# Patient Record
Sex: MALE | Race: WHITE | NOT HISPANIC OR LATINO | Employment: FULL TIME
[De-identification: names, ages, dates, MRNs, and addresses within clinical notes are randomized per-mention and may not be internally consistent; named-entity substitution may affect disease eponyms.]

---

## 2018-12-10 ENCOUNTER — RECORDS - HEALTHEAST (OUTPATIENT)
Dept: ADMINISTRATIVE | Facility: OTHER | Age: 58
End: 2018-12-10

## 2019-06-13 ENCOUNTER — RECORDS - HEALTHEAST (OUTPATIENT)
Dept: ADMINISTRATIVE | Facility: OTHER | Age: 59
End: 2019-06-13

## 2019-06-13 LAB
ALT SERPL W/O P-5'-P-CCNC: 38 U/L (ref 21–72)
AST SERPL-CCNC: 31 U/L (ref 17–59)
CHOLEST SERPL-MCNC: 299 MG/DL (ref 0–200)
CREAT SERPL-MCNC: 0.9 MG/DL (ref 0.7–1.4)
GFR ESTIMATE EXT - HISTORICAL: >60 ML/MIN/1.73M2
HDLC SERPL-MCNC: 107 MG/DL (ref 35–65)
LDLC SERPL CALC-MCNC: 171 MG/DL (ref 0–130)
TRIGLYCERIDES (HISTORICAL CONVERSION): 101 MG/DL (ref 0–200)

## 2021-01-26 ENCOUNTER — COMMUNICATION - HEALTHEAST (OUTPATIENT)
Dept: FAMILY MEDICINE | Facility: CLINIC | Age: 61
End: 2021-01-26

## 2021-01-26 ENCOUNTER — OFFICE VISIT - HEALTHEAST (OUTPATIENT)
Dept: FAMILY MEDICINE | Facility: CLINIC | Age: 61
End: 2021-01-26

## 2021-01-26 DIAGNOSIS — Z76.89 ENCOUNTER TO ESTABLISH CARE: ICD-10-CM

## 2021-01-26 DIAGNOSIS — G47.33 OSA ON CPAP: ICD-10-CM

## 2021-01-26 DIAGNOSIS — Z00.00 ROUTINE GENERAL MEDICAL EXAMINATION AT A HEALTH CARE FACILITY: ICD-10-CM

## 2021-01-26 DIAGNOSIS — K57.30 DIVERTICULOSIS OF LARGE INTESTINE WITHOUT HEMORRHAGE: ICD-10-CM

## 2021-01-26 LAB
ALBUMIN SERPL-MCNC: 4.6 G/DL (ref 3.5–5)
ALP SERPL-CCNC: 66 U/L (ref 45–120)
ALT SERPL W P-5'-P-CCNC: 28 U/L (ref 0–45)
ANION GAP SERPL CALCULATED.3IONS-SCNC: 10 MMOL/L (ref 5–18)
AST SERPL W P-5'-P-CCNC: 28 U/L (ref 0–40)
BILIRUB SERPL-MCNC: 0.8 MG/DL (ref 0–1)
BUN SERPL-MCNC: 17 MG/DL (ref 8–22)
CALCIUM SERPL-MCNC: 9.5 MG/DL (ref 8.5–10.5)
CHLORIDE BLD-SCNC: 100 MMOL/L (ref 98–107)
CHOLEST SERPL-MCNC: 307 MG/DL
CO2 SERPL-SCNC: 27 MMOL/L (ref 22–31)
CREAT SERPL-MCNC: 0.94 MG/DL (ref 0.7–1.3)
FASTING STATUS PATIENT QL REPORTED: ABNORMAL
GFR SERPL CREATININE-BSD FRML MDRD: >60 ML/MIN/1.73M2
GLUCOSE BLD-MCNC: 98 MG/DL (ref 70–125)
HDLC SERPL-MCNC: 93 MG/DL
LDLC SERPL CALC-MCNC: 198 MG/DL
POTASSIUM BLD-SCNC: 4.4 MMOL/L (ref 3.5–5)
PROT SERPL-MCNC: 7.5 G/DL (ref 6–8)
PSA SERPL-MCNC: 1.4 NG/ML (ref 0–3.5)
SODIUM SERPL-SCNC: 137 MMOL/L (ref 136–145)
TRIGL SERPL-MCNC: 79 MG/DL

## 2021-01-26 RX ORDER — TADALAFIL 5 MG/1
5 TABLET ORAL
Status: SHIPPED | COMMUNITY
Start: 2021-01-26 | End: 2021-09-20

## 2021-01-26 RX ORDER — ASPIRIN 325 MG
325 TABLET ORAL
Status: SHIPPED | COMMUNITY
Start: 2021-01-26

## 2021-01-26 ASSESSMENT — ANXIETY QUESTIONNAIRES
5. BEING SO RESTLESS THAT IT IS HARD TO SIT STILL: SEVERAL DAYS
7. FEELING AFRAID AS IF SOMETHING AWFUL MIGHT HAPPEN: NOT AT ALL
6. BECOMING EASILY ANNOYED OR IRRITABLE: SEVERAL DAYS
4. TROUBLE RELAXING: SEVERAL DAYS
2. NOT BEING ABLE TO STOP OR CONTROL WORRYING: SEVERAL DAYS
IF YOU CHECKED OFF ANY PROBLEMS ON THIS QUESTIONNAIRE, HOW DIFFICULT HAVE THESE PROBLEMS MADE IT FOR YOU TO DO YOUR WORK, TAKE CARE OF THINGS AT HOME, OR GET ALONG WITH OTHER PEOPLE: NOT DIFFICULT AT ALL
GAD7 TOTAL SCORE: 6
3. WORRYING TOO MUCH ABOUT DIFFERENT THINGS: SEVERAL DAYS
1. FEELING NERVOUS, ANXIOUS, OR ON EDGE: SEVERAL DAYS

## 2021-01-26 ASSESSMENT — MIFFLIN-ST. JEOR: SCORE: 2079.09

## 2021-01-26 ASSESSMENT — PATIENT HEALTH QUESTIONNAIRE - PHQ9: SUM OF ALL RESPONSES TO PHQ QUESTIONS 1-9: 0

## 2021-01-27 ENCOUNTER — AMBULATORY - HEALTHEAST (OUTPATIENT)
Dept: FAMILY MEDICINE | Facility: CLINIC | Age: 61
End: 2021-01-27

## 2021-01-27 ENCOUNTER — RECORDS - HEALTHEAST (OUTPATIENT)
Dept: ADMINISTRATIVE | Facility: OTHER | Age: 61
End: 2021-01-27

## 2021-01-27 ENCOUNTER — COMMUNICATION - HEALTHEAST (OUTPATIENT)
Dept: FAMILY MEDICINE | Facility: CLINIC | Age: 61
End: 2021-01-27

## 2021-01-27 DIAGNOSIS — E78.5 HYPERLIPIDEMIA, UNSPECIFIED HYPERLIPIDEMIA TYPE: ICD-10-CM

## 2021-01-27 RX ORDER — ATORVASTATIN CALCIUM 40 MG/1
40 TABLET, FILM COATED ORAL AT BEDTIME
Qty: 90 TABLET | Refills: 2 | Status: SHIPPED | OUTPATIENT
Start: 2021-01-27 | End: 2022-01-05

## 2021-02-02 ENCOUNTER — RECORDS - HEALTHEAST (OUTPATIENT)
Dept: HEALTH INFORMATION MANAGEMENT | Facility: CLINIC | Age: 61
End: 2021-02-02

## 2021-05-27 ASSESSMENT — PATIENT HEALTH QUESTIONNAIRE - PHQ9: SUM OF ALL RESPONSES TO PHQ QUESTIONS 1-9: 0

## 2021-05-28 ASSESSMENT — ANXIETY QUESTIONNAIRES: GAD7 TOTAL SCORE: 6

## 2021-06-05 VITALS
DIASTOLIC BLOOD PRESSURE: 74 MMHG | TEMPERATURE: 97.9 F | WEIGHT: 258.7 LBS | HEIGHT: 75 IN | SYSTOLIC BLOOD PRESSURE: 120 MMHG | BODY MASS INDEX: 32.16 KG/M2 | HEART RATE: 70 BPM

## 2021-06-14 NOTE — TELEPHONE ENCOUNTER
----- Message from Yunier Moore MD sent at 1/26/2021  6:23 PM CST -----  Please inform the patient that the labs that he did for his recent physical exam did show that his cholesterol level is really high at 307, his LDL is also high at 198. Both of this is at the level whereby the recommendation will be to have you started on cholesterol medication.  Even though that was a calculation of the cardiovascular disease risk at 10 years and that was 6.2% which is a still borderline regarding medication management, but I think that at this point medication is warranted because of the level of the individual cholesterol.  Otherwise the rest of the lab including the PSA as well as the kidney and liver function were all normal.  Please let me know if there is any questions.

## 2021-06-14 NOTE — TELEPHONE ENCOUNTER
Left message to call back for: patient  Information to relay to patient:  Please relay message to pt.

## 2021-06-14 NOTE — TELEPHONE ENCOUNTER
I let pt know medication was sent to pharmacy and what sx's to look for per Dr. Moore. He will follow up in 3-6 months.      Vicky Acosta, ALEXYSA

## 2021-06-14 NOTE — TELEPHONE ENCOUNTER
I have sent in a prescription for Atorvastatin at 40 mg daily. Please inform him to watch out for muscle aches and apin mainly at the hips and thigh and shoulders. If those happen he should please inform us. Will need to follow up for labs in 3-6 months.

## 2021-06-14 NOTE — TELEPHONE ENCOUNTER
Relayed message, pt interested in starting medication for this. Please send to pt's pharmacy on file.     JORDAN Self

## 2021-06-21 NOTE — LETTER
Letter by Yunier Moore MD at      Author: Yunier Moore MD Service: -- Author Type: --    Filed:  Encounter Date: 1/26/2021 Status: (Other)         Barron Delgado  [address]  [facility] [address]             January 26, 2021         Dear Mr. Delgado,    Below are the results from your recent visit:    Resulted Orders   Lipid Cascade   Result Value Ref Range    Cholesterol 307 (H) <=199 mg/dL    Triglycerides 79 <=149 mg/dL    HDL Cholesterol 93 >=40 mg/dL    LDL Calculated 198 (H) <=129 mg/dL    Patient Fasting > 8hrs? Unknown    Comprehensive Metabolic Panel   Result Value Ref Range    Sodium 137 136 - 145 mmol/L    Potassium 4.4 3.5 - 5.0 mmol/L    Chloride 100 98 - 107 mmol/L    CO2 27 22 - 31 mmol/L    Anion Gap, Calculation 10 5 - 18 mmol/L    Glucose 98 70 - 125 mg/dL    BUN 17 8 - 22 mg/dL    Creatinine 0.94 0.70 - 1.30 mg/dL    GFR MDRD Af Amer >60 >60 mL/min/1.73m2    GFR MDRD Non Af Amer >60 >60 mL/min/1.73m2    Bilirubin, Total 0.8 0.0 - 1.0 mg/dL    Calcium 9.5 8.5 - 10.5 mg/dL    Protein, Total 7.5 6.0 - 8.0 g/dL    Albumin 4.6 3.5 - 5.0 g/dL    Alkaline Phosphatase 66 45 - 120 U/L    AST 28 0 - 40 U/L    ALT 28 0 - 45 U/L    Narrative    Fasting Glucose reference range is 70-99 mg/dL per  American Diabetes Association (ADA) guidelines.   PSA, Annual Screen (Prostatic-Specific Antigen)   Result Value Ref Range    PSA 1.4 0.0 - 3.5 ng/mL    Narrative    Method is Abbott Prostate-Specific Antigen (PSA)  Standard-WHO 1st International (90:10)     Please inform the patient that the labs that he did for his recent physical exam did show that his cholesterol level is really high at 307, his LDL is also high at 198. Both of this is at the level whereby the recommendation will be to have you started on cholesterol medication.  Even though that was a calculation of the cardiovascular disease risk at 10 years and that was 6.2% which is a still borderline  regarding medication management, but I think that at this point medication is warranted because of the level of the individual cholesterol.  Otherwise the rest of the lab including the PSA as well as the kidney and liver function were all normal.  Please let me know if there is any questions.      Please call with questions or contact us using [employer].    Sincerely,        Electronically signed by Yunier Moore MD

## 2021-06-27 ENCOUNTER — HEALTH MAINTENANCE LETTER (OUTPATIENT)
Age: 61
End: 2021-06-27

## 2021-06-30 ENCOUNTER — OFFICE VISIT - HEALTHEAST (OUTPATIENT)
Dept: FAMILY MEDICINE | Facility: CLINIC | Age: 61
End: 2021-06-30

## 2021-06-30 DIAGNOSIS — E78.5 HYPERLIPIDEMIA, UNSPECIFIED HYPERLIPIDEMIA TYPE: ICD-10-CM

## 2021-06-30 LAB
CHOLEST SERPL-MCNC: 223 MG/DL
FASTING STATUS PATIENT QL REPORTED: YES
HDLC SERPL-MCNC: 85 MG/DL
LDLC SERPL CALC-MCNC: 120 MG/DL
TRIGL SERPL-MCNC: 88 MG/DL

## 2021-06-30 NOTE — PROGRESS NOTES
Progress Notes by Yunier Moore MD at 1/26/2021 12:00 PM     Author: Yunier Moore MD Service: -- Author Type: Physician    Filed: 1/26/2021  6:24 PM Encounter Date: 1/26/2021 Status: Signed    : Yunier Moore MD (Physician)       MALE PREVENTATIVE EXAM    Assessment and Plan:     Patient has been advised of split billing requirements and indicates understanding: Yes    1. Routine general medical examination at a health care facility  - Lipid Cascade  - Comprehensive Metabolic Panel  - PSA, Annual Screen (Prostatic-Specific Antigen)    2. Diverticulosis of large intestine without hemorrhage    3. Encounter to establish care    4. ZABRINA on CPAP    Reviewed these labs previously on his telephone and that he does have an increase in his cholesterol.  But since he is fasting we will go ahead and get it done today.  Daily discussed physical activity and exercise.  Discussed ASCVD risk score and will calculate that based on his results at this time.  He did sign a release of information to get his other medical information including his tetanus as well as colonoscopies.  Next follow up:  No follow-ups on file.    Immunization Review  Adult Imm Review: No immunizations due today    I discussed the following with the patient:   Adult Healthy Living: Importance of regular exercise  Healthy nutrition  Weight loss options        Subjective:   Chief Complaint: Barron Delgado is an 60 y.o. male here for a preventative health visit.    Patient has been advised of split billing requirements and indicates understanding: Yes  HPI: Patient who is new to the clinic in 3 months of Coumadin today to establish care as well as he is a physical exam.  And noted that he had a physical exam a year ago.  And major finding is that of an increase in his cholesterol level.  He noted that he has been exercising and hoping to lower it.  Noted having been on any medication for cholesterol a long  "time ago and had to stop because his wife does not like it.  We will see what the level is at this time.  Did review his past history and he does have a sleep apnea, and uses CPAP machine.  He also does have a diverticulosis and has had a diverticulitis in the past.  And noted that he did have colonoscopy about 4 years was ago.  I will get his history.    Healthy Habits  Are you taking a daily aspirin? Yes  Do you typically exercising at least 40 min, 3-4 times per week?  Yes  Do you usually eat at least 4 servings of fruit and vegetables a day, include whole grains and fiber and avoid regularly eating high fat foods? no  Have you had an eye exam in the past two years? NO  Do you see a dentist twice per year? Yes  Do you have any concerns regarding sleep? YES    Safety Screen  If you own firearms, are they secured in a locked gun cabinet or with trigger locks? Yes  No data recorded    Review of Systems: A full review of system was done today and he feels well and does not have any concerns today.  Please see above.  The rest of the review of systems are negative for all systems.     Cancer Screening     Patient has no health maintenance due at this time          Patient Care Team:  Yunier Moore MD as PCP - General (Family Medicine)        History     Reviewed By Date/Time Sections Reviewed    Yunier Moore MD 1/26/2021 12:17 PM Medical, Surgical, Tobacco, Alcohol, Drug Use, Sexual Activity, Family    Rosalia [facility] CMA 1/26/2021 11:50 AM Tobacco            Objective:   Vital Signs:      Vitals:    01/26/21 1150   BP: 120/74   Patient Site: Left Arm   Patient Position: Sitting   Cuff Size: Adult Large   Pulse: 70   Temp: 97.9  F (36.6  C)   TempSrc: Oral   Weight: (!) 258 lb 11.2 oz (117.3 kg)   Height: 6' 3\" (1.905 m)       Physical Exam:  General Appearance: Alert, cooperative, no distress, appears stated age  Head: Normocephalic, without obvious abnormality, atraumatic  Eyes: " PERRL, conjunctiva/corneas clear, EOM's intact  Ears: Normal TM's and external ear canals, both ears  Nose: Nares normal, septum midline,mucosa normal, no drainage  Throat: Lips, mucosa, and tongue normal; teeth and gums normal  Neck: Supple, symmetrical, trachea midline, no adenopathy;  thyroid: not enlarged, symmetric, no tenderness/mass/nodules; no carotid bruit or JVD  Back: Symmetric, no curvature, ROM normal, no CVA tenderness  Lungs: Clear to auscultation bilaterally, respirations unlabored  Heart: Regular rate and rhythm, S1 and S2 normal, no murmur, rub, or gallop,  Abdomen: Soft, non-tender, bowel sounds active all four quadrants,  no masses, no organomegaly  Musculoskeletal: Normal range of motion. No joint swelling or deformity.   Extremities: Extremities normal, atraumatic, no cyanosis or edema  Skin: Skin color, texture, turgor normal, no rashes or lesions  Lymph nodes: Cervical, supraclavicular, and axillary nodes normal  Neurologic: He is alert. He has normal reflexes.   Psychiatric: He has a normal mood and affect.       The 10-year ASCVD risk score (Doyle ELIEZER Jr., et al., 2013) is: 6.2%    Values used to calculate the score:      Age: 60 years      Sex: Male      Is Non- : No      Diabetic: No      Tobacco smoker: No      Systolic Blood Pressure: 120 mmHg      Is BP treated: No      HDL Cholesterol: 93 mg/dL      Total Cholesterol: 307 mg/dL         Medication List          Accurate as of January 26, 2021 12:49 PM. If you have any questions, ask your nurse or doctor.            CONTINUE taking these medications    aspirin 325 MG tablet  INSTRUCTIONS: Take 325 mg by mouth.        cholecalciferol (vitamin D3) 50 mcg (2,000 unit) Tab  INSTRUCTIONS: Take 2,000 Units by mouth.        FISH OIL ORAL  INSTRUCTIONS: Take by mouth.        melatonin 5 mg Tab tablet  INSTRUCTIONS: Take 5 mg by mouth.        MEN'S 50 PLUS MULTIVITAMIN ORAL  INSTRUCTIONS: Take by mouth.        tadalafiL 5  MG tablet  Also known as: CIALIS  INSTRUCTIONS: Take 5 mg by mouth.        ZINC ACETATE-SWEETLEAF ORAL               Additional Screenings Completed Today:

## 2021-07-04 NOTE — PROGRESS NOTES
"Progress Notes by Yunier Moore MD at 6/30/2021 11:00 AM     Author: Yunier Moore MD Service: -- Author Type: Physician    Filed: 6/30/2021  3:47 PM Encounter Date: 6/30/2021 Status: Signed    : Yunier Moore MD (Physician)           Assessment & Plan     Hyperlipidemia, unspecified hyperlipidemia type  - Lipid Cascade FASTING  He is a fasted today and I will go ahead and have him get the labs.  I think that we will likely continue with the same dose of medication that he has been.  But glad that he does not have any side effects to the medication at this time.  Did discuss healthcare maintenance.  His last colonoscopy was in 2015 and he was given a 10year follow-up.  Disorders according to the care everywhere note from [facility] includes the [facility] through [facility].  I will have that abstracted.   :[id number]}     BMI:   Estimated body mass index is 32.53 kg/m  as calculated from the following:    Height as of 1/26/21: 6' 3\" (1.905 m).    Weight as of this encounter: 260 lb 4 oz (118 kg).   The following high BMI interventions were performed this visit: encouragement to exercise, weight monitoring and prescribed dietary intake    No follow-ups on file.    Yunier Moore MD  [facility]   Barron Delgado is 60 y.o. and presents today for the following health issues   HPI   He had high cholesterol noted on his fasting lab.  This was done earlier in January.  He was started on medication atorvastatin 40 mg daily which she has been taking consistently.  Noted no side effects to the medication.  Her does not have any aches and pain and no nausea or vomiting.  He is here fasted to have a recheck of the cholesterol level.  Noted no major concerns today.  He also noted having done his last colonoscopy in 2015.  This is in care everywhere and we did review that.    Past Medical History:   Diagnosis Date   ? Diverticulitis  "     Past Surgical History:   Procedure Laterality Date   ? BRAIN SURGERY      Aneurysmal Ambolization   ? TONSILLECTOMY AND ADENOIDECTOMY       Social History     Socioeconomic History   ? Marital status:      Spouse name: None   ? Number of children: None   ? Years of education: None   ? Highest education level: None   Occupational History   ? None   Social Needs   ? Financial resource strain: None   ? Food insecurity     Worry: None     Inability: None   ? Transportation needs     Medical: None     Non-medical: None   Tobacco Use   ? Smoking status: Never Smoker   ? Smokeless tobacco: Never Used   Substance and Sexual Activity   ? Alcohol use: Yes     Alcohol/week: 10.0 standard drinks     Types: 7 Glasses of wine, 3 Cans of beer per week     Comment: Occasionally   ? Drug use: None   ? Sexual activity: Yes     Partners: Female   Lifestyle   ? Physical activity     Days per week: None     Minutes per session: None   ? Stress: None   Relationships   ? Social connections     Talks on phone: None     Gets together: None     Attends [employer] service: None     Active member of club or organization: None     Attends meetings of clubs or organizations: None     Relationship status: None   ? Intimate partner violence     Fear of current or ex partner: None     Emotionally abused: None     Physically abused: None     Forced sexual activity: None   Other Topics Concern   ? None   Social History Narrative   ? None     Family History   Problem Relation Age of Onset   ? Breast cancer Mother    ? Heart disease Father         Valvular HEART FROM PACEMAKER   ? Seizures Son    ? Colon cancer Paternal Grandfather      No Known Allergies  Current Outpatient Medications   Medication Sig Dispense Refill   ? aspirin 325 MG tablet Take 325 mg by mouth.     ? atorvastatin (LIPITOR) 40 MG tablet Take 1 tablet (40 mg total) by mouth at bedtime. 90 tablet 2   ? cholecalciferol, vitamin D3, 50 mcg (2,000 unit) Tab Take 2,000 Units by  mouth.     ? docosahexaenoic acid/epa (FISH OIL ORAL) Take by mouth.     ? melatonin 5 mg Tab tablet Take 5 mg by mouth.     ? multivit-min/folic/vit K/lycop (MEN'S 50 PLUS MULTIVITAMIN ORAL) Take by mouth.     ? tadalafiL (CIALIS) 5 MG tablet Take 5 mg by mouth.     ? ZINC ACETATE-SWEETLEAF ORAL        No current facility-administered medications for this visit.          Review of Systems   Constitutional: Negative.    All other systems reviewed and are negative.          Objective    /78 (Patient Site: Left Arm, Patient Position: Sitting, Cuff Size: Adult Large)   Pulse (!) 58   Wt (!) 260 lb 4 oz (118 kg)   SpO2 96%   BMI 32.53 kg/m    Body mass index is 32.53 kg/m .  Physical Exam   Constitutional: He appears well-developed and well-nourished. No distress.   Cardiovascular: Normal rate and regular rhythm.   Pulmonary/Chest: Effort normal and breath sounds normal.   Neurological: He is alert.

## 2021-07-06 VITALS
BODY MASS INDEX: 32.53 KG/M2 | HEART RATE: 58 BPM | SYSTOLIC BLOOD PRESSURE: 128 MMHG | OXYGEN SATURATION: 96 % | DIASTOLIC BLOOD PRESSURE: 78 MMHG | WEIGHT: 260.25 LBS

## 2021-09-16 ENCOUNTER — MYC MEDICAL ADVICE (OUTPATIENT)
Dept: FAMILY MEDICINE | Facility: CLINIC | Age: 61
End: 2021-09-16

## 2021-09-16 DIAGNOSIS — N52.9 ERECTILE DYSFUNCTION, UNSPECIFIED ERECTILE DYSFUNCTION TYPE: Primary | ICD-10-CM

## 2021-09-20 RX ORDER — TADALAFIL 5 MG/1
5 TABLET ORAL DAILY PRN
Qty: 30 TABLET | Refills: 2 | Status: SHIPPED | OUTPATIENT
Start: 2021-09-20

## 2021-09-27 ENCOUNTER — MYC MEDICAL ADVICE (OUTPATIENT)
Dept: FAMILY MEDICINE | Facility: CLINIC | Age: 61
End: 2021-09-27

## 2021-10-16 ENCOUNTER — HEALTH MAINTENANCE LETTER (OUTPATIENT)
Age: 61
End: 2021-10-16

## 2022-01-03 DIAGNOSIS — E78.5 HYPERLIPIDEMIA, UNSPECIFIED HYPERLIPIDEMIA TYPE: ICD-10-CM

## 2022-01-05 RX ORDER — ATORVASTATIN CALCIUM 40 MG/1
TABLET, FILM COATED ORAL
Qty: 90 TABLET | Refills: 1 | Status: SHIPPED | OUTPATIENT
Start: 2022-01-05 | End: 2022-01-06

## 2022-01-05 NOTE — TELEPHONE ENCOUNTER
"Last Written Prescription Date:  1/27/21  Last Fill Quantity: 90,  # refills: 2   Last office visit provider:  6/30/21     Requested Prescriptions   Pending Prescriptions Disp Refills     atorvastatin (LIPITOR) 40 MG tablet [Pharmacy Med Name: ATORVASTATIN 40MG TABLETS] 90 tablet 2     Sig: TAKE 1 TABLET(40 MG) BY MOUTH AT BEDTIME       Statins Protocol Passed - 1/3/2022  8:23 AM        Passed - LDL on file in past 12 months     Recent Labs   Lab Test 06/30/21  1121                Passed - No abnormal creatine kinase in past 12 months     No lab results found.             Passed - Recent (12 mo) or future (30 days) visit within the authorizing provider's specialty     Patient has had an office visit with the authorizing provider or a provider within the authorizing providers department within the previous 12 mos or has a future within next 30 days. See \"Patient Info\" tab in inbasket, or \"Choose Columns\" in Meds & Orders section of the refill encounter.              Passed - Medication is active on med list        Passed - Patient is age 18 or older             Jacob Roy RN 01/05/22 2:12 PM  "

## 2022-01-06 DIAGNOSIS — E78.5 HYPERLIPIDEMIA, UNSPECIFIED HYPERLIPIDEMIA TYPE: ICD-10-CM

## 2022-01-06 NOTE — TELEPHONE ENCOUNTER
Pending Prescriptions:                       Disp   Refills    atorvastatin (LIPITOR) 40 MG tablet       90 tab*1            Sig: Take 1 tablet (40 mg) by mouth At Bedtime    Last seen 6/30/21 with labs by Valencia MACHUCA on 1/6/2022 at 9:07 AM

## 2022-01-07 RX ORDER — ATORVASTATIN CALCIUM 40 MG/1
40 TABLET, FILM COATED ORAL AT BEDTIME
Qty: 90 TABLET | Refills: 3 | Status: SHIPPED | OUTPATIENT
Start: 2022-01-07 | End: 2023-01-12

## 2022-04-02 ENCOUNTER — HEALTH MAINTENANCE LETTER (OUTPATIENT)
Age: 62
End: 2022-04-02

## 2022-04-26 ENCOUNTER — OFFICE VISIT (OUTPATIENT)
Dept: FAMILY MEDICINE | Facility: CLINIC | Age: 62
End: 2022-04-26
Payer: COMMERCIAL

## 2022-04-26 VITALS
SYSTOLIC BLOOD PRESSURE: 135 MMHG | DIASTOLIC BLOOD PRESSURE: 75 MMHG | BODY MASS INDEX: 33.17 KG/M2 | WEIGHT: 265.4 LBS | HEART RATE: 62 BPM

## 2022-04-26 DIAGNOSIS — N52.9 ERECTILE DYSFUNCTION, UNSPECIFIED ERECTILE DYSFUNCTION TYPE: ICD-10-CM

## 2022-04-26 DIAGNOSIS — Z00.00 ROUTINE GENERAL MEDICAL EXAMINATION AT A HEALTH CARE FACILITY: Primary | ICD-10-CM

## 2022-04-26 DIAGNOSIS — Z11.59 NEED FOR HEPATITIS C SCREENING TEST: ICD-10-CM

## 2022-04-26 DIAGNOSIS — Z86.0100 HISTORY OF COLON POLYPS: ICD-10-CM

## 2022-04-26 LAB
ALBUMIN SERPL-MCNC: 4.4 G/DL (ref 3.5–5)
ALP SERPL-CCNC: 64 U/L (ref 45–120)
ALT SERPL W P-5'-P-CCNC: 31 U/L (ref 0–45)
ANION GAP SERPL CALCULATED.3IONS-SCNC: 12 MMOL/L (ref 5–18)
AST SERPL W P-5'-P-CCNC: 25 U/L (ref 0–40)
BILIRUB SERPL-MCNC: 0.9 MG/DL (ref 0–1)
BUN SERPL-MCNC: 19 MG/DL (ref 8–22)
CALCIUM SERPL-MCNC: 9.7 MG/DL (ref 8.5–10.5)
CHLORIDE BLD-SCNC: 102 MMOL/L (ref 98–107)
CHOLEST SERPL-MCNC: 242 MG/DL
CO2 SERPL-SCNC: 25 MMOL/L (ref 22–31)
CREAT SERPL-MCNC: 0.95 MG/DL (ref 0.7–1.3)
FASTING STATUS PATIENT QL REPORTED: ABNORMAL
GFR SERPL CREATININE-BSD FRML MDRD: >90 ML/MIN/1.73M2
GLUCOSE BLD-MCNC: 103 MG/DL (ref 70–125)
HDLC SERPL-MCNC: 91 MG/DL
LDLC SERPL CALC-MCNC: 134 MG/DL
POTASSIUM BLD-SCNC: 4.9 MMOL/L (ref 3.5–5)
PROT SERPL-MCNC: 7.5 G/DL (ref 6–8)
PSA SERPL-MCNC: 1.26 UG/L (ref 0–3.5)
SODIUM SERPL-SCNC: 139 MMOL/L (ref 136–145)
TRIGL SERPL-MCNC: 87 MG/DL

## 2022-04-26 PROCEDURE — G0103 PSA SCREENING: HCPCS | Performed by: FAMILY MEDICINE

## 2022-04-26 PROCEDURE — 99396 PREV VISIT EST AGE 40-64: CPT | Performed by: FAMILY MEDICINE

## 2022-04-26 PROCEDURE — 80053 COMPREHEN METABOLIC PANEL: CPT | Performed by: FAMILY MEDICINE

## 2022-04-26 PROCEDURE — 36415 COLL VENOUS BLD VENIPUNCTURE: CPT | Performed by: FAMILY MEDICINE

## 2022-04-26 PROCEDURE — 80061 LIPID PANEL: CPT | Performed by: FAMILY MEDICINE

## 2022-04-26 PROCEDURE — 86803 HEPATITIS C AB TEST: CPT | Performed by: FAMILY MEDICINE

## 2022-04-26 PROCEDURE — 86787 VARICELLA-ZOSTER ANTIBODY: CPT | Performed by: FAMILY MEDICINE

## 2022-04-26 RX ORDER — SILDENAFIL 50 MG/1
50 TABLET, FILM COATED ORAL DAILY PRN
Qty: 30 TABLET | Refills: 1 | Status: SHIPPED | OUTPATIENT
Start: 2022-04-26

## 2022-04-26 NOTE — PROGRESS NOTES
SUBJECTIVE:   CC: Barron Delgado is an 61 year old male who presents for preventative health visit.       Patient has been advised of split billing requirements and indicates understanding: Yes  Healthy Habits:     Getting at least 3 servings of Calcium per day:  NO    Bi-annual eye exam:  NO    Dental care twice a year:  Yes    Sleep apnea or symptoms of sleep apnea:  Daytime drowsiness, Excessive snoring and Sleep apnea    Diet:  Regular (no restrictions)    Frequency of exercise:  4-5 days/week    Duration of exercise:  Greater than 60 minutes    Taking medications regularly:  Yes    Medication side effects:  None    PHQ-2 Total Score: 0    Additional concerns today:  No    Is here for physical exam.  He does have a history of sleep apnea does have problems CPAP machine but noted that he has not been using it at this time because of losing one of the consumables.  Noted that he will be using it better now.  Also has had intermittent blood in his urine.  Noted that he had colonoscopy around 2015 probably, wonders if he needed to have another colonoscopy at this time.  He has had a previous history of colon polyps which was not noted on the last colonoscopy.  He otherwise does not have any other concerns with that.  He does feel better, has intermittent insomnia and wakes up middle of the night but will let him know if he needed to have any management for that.        Today's PHQ-2 Score:   PHQ-2 ( 1999 Pfizer) 4/19/2022   Q1: Little interest or pleasure in doing things 0   Q2: Feeling down, depressed or hopeless 0   PHQ-2 Score 0   Q1: Little interest or pleasure in doing things Not at all   Q2: Feeling down, depressed or hopeless Not at all   PHQ-2 Score 0       Abuse: Current or Past(Physical, Sexual or Emotional)- No  Do you feel safe in your environment? Yes        Social History     Tobacco Use     Smoking status: Never Smoker     Smokeless tobacco: Never Used   Substance Use Topics     Alcohol use: Yes      Alcohol/week: 10.0 standard drinks     Comment: Alcoholic Drinks/day: Occasionally     If you drink alcohol do you typically have >3 drinks per day or >7 drinks per week? No    Alcohol Use 4/19/2022   Prescreen: >3 drinks/day or >7 drinks/week? Yes   AUDIT SCORE  7     AUDIT - Alcohol Use Disorders Identification Test - Reproduced from the World Health Organization Audit 2001 (Second Edition) 4/19/2022   1.  How often do you have a drink containing alcohol? 2 to 3 times a week   2.  How many drinks containing alcohol do you have on a typical day when you are drinking? 3 or 4   3.  How often do you have five or more drinks on one occasion? Weekly   4.  How often during the last year have you found that you were not able to stop drinking once you had started? Never   5.  How often during the last year have you failed to do what was normally expected of you because of drinking? Never   6.  How often during the last year have you needed a first drink in the morning to get yourself going after a heavy drinking session? Never   7.  How often during the last year have you had a feeling of guilt or remorse after drinking? Never   8.  How often during the last year have you been unable to remember what happened the night before because of your drinking? Never   9.  Have you or someone else been injured because of your drinking? No   10. Has a relative, friend, doctor or other health care worker been concerned about your drinking or suggested you cut down? No   TOTAL SCORE 7       Last PSA:   Prostate Specific Antigen Screen   Date Value Ref Range Status   01/26/2021 1.4 0.0 - 3.5 ng/mL Final       Reviewed orders with patient. Reviewed health maintenance and updated orders accordingly - Yes      Reviewed and updated as needed this visit by clinical staff    Allergies  Meds                Reviewed and updated as needed this visit by Provider                   Family History   Problem Relation Age of Onset     Breast Cancer  Mother      Heart Disease Father         Valvular HEART FROM PACEMAKER     Seizure Disorder Son      Colon Cancer Paternal Grandfather       Social History     Socioeconomic History     Marital status:      Spouse name: Not on file     Number of children: Not on file     Years of education: Not on file     Highest education level: Not on file   Occupational History     Not on file   Tobacco Use     Smoking status: Never Smoker     Smokeless tobacco: Never Used   Substance and Sexual Activity     Alcohol use: Yes     Alcohol/week: 10.0 standard drinks     Comment: Alcoholic Drinks/day: Occasionally     Drug use: Not on file     Sexual activity: Yes     Partners: Female   Other Topics Concern     Not on file   Social History Narrative     Not on file     Social Determinants of Health     Financial Resource Strain: Not on file   Food Insecurity: Not on file   Transportation Needs: Not on file   Physical Activity: Not on file   Stress: Not on file   Social Connections: Not on file   Intimate Partner Violence: Not on file   Housing Stability: Not on file      Past Surgical History:   Procedure Laterality Date     BRAIN SURGERY      Aneurysmal Ambolization     TONSILLECTOMY & ADENOIDECTOMY        Past Medical History:   Diagnosis Date     Diverticulitis           Review of Systems  CONSTITUTIONAL: NEGATIVE for fever, chills, change in weight  INTEGUMENTARY/SKIN: NEGATIVE for worrisome rashes, moles or lesions  EYES: NEGATIVE for vision changes or irritation  ENT: NEGATIVE for ear, mouth and throat problems  RESP: NEGATIVE for significant cough or SOB  CV: NEGATIVE for chest pain, palpitations or peripheral edema  GI: NEGATIVE for nausea, abdominal pain, heartburn, or change in bowel habits   male: negative for dysuria, hematuria, decreased urinary stream, erectile dysfunction, urethral discharge  MUSCULOSKELETAL: NEGATIVE for significant arthralgias or myalgia  NEURO: NEGATIVE for weakness, dizziness or  paresthesias  PSYCHIATRIC: NEGATIVE for changes in mood or affect    OBJECTIVE:   /75 (BP Location: Left arm, Patient Position: Sitting, Cuff Size: Adult Large)   Pulse 62   Wt 120.4 kg (265 lb 6.4 oz)   BMI 33.17 kg/m      Physical Exam  GENERAL: healthy, alert and no distress  EYES: Eyes grossly normal to inspection, PERRL and conjunctivae and sclerae normal  HENT: ear canals and TM's normal, nose and mouth without ulcers or lesions  NECK: no adenopathy, no asymmetry, masses, or scars and thyroid normal to palpation  RESP: lungs clear to auscultation - no rales, rhonchi or wheezes  CV: regular rate and rhythm, normal S1 S2, no S3 or S4, no murmur, click or rub, no peripheral edema and peripheral pulses strong  ABDOMEN: soft, nontender, no hepatosplenomegaly, no masses and bowel sounds normal  MS: no gross musculoskeletal defects noted, no edema  SKIN: Examination done showing multiple skin seborrhea no concerning skin lesion was seen.  NEURO: Normal strength and tone, mentation intact and speech normal  PSYCH: mentation appears normal, affect normal/bright        ASSESSMENT/PLAN:   Barron was seen today for physical.    Diagnoses and all orders for this visit:    Routine general medical examination at a health care facility  -     Lipid Profile (Chol, Trig, HDL, LDL calc); Future  -     Comprehensive metabolic panel (BMP + Alb, Alk Phos, ALT, AST, Total. Bili, TP); Future  -     PSA, screen; Future  -     Varicella Zoster Virus Antibody IgG; Future  -     REVIEW OF HEALTH MAINTENANCE PROTOCOL ORDERS    Erectile dysfunction, unspecified erectile dysfunction type  -     sildenafil (VIAGRA) 50 MG tablet; Take 1 tablet (50 mg) by mouth daily as needed (ED)    History of colon polyps  -     Adult Gastro Ref - Procedure Only; Future    Need for hepatitis C screening test  -     Hepatitis C antibody; Future    Discussed his questions.  Answers were provided.  I did want him to get his colonoscopy since he had a  "last time in 2015 and since he has had a polyp it is necessary that he gets a colonoscopy within 5years.  This is overdue.  I did also discussed with him the possible causes of the rectal bleeding.  Discussed the health maintenance needs.  Patient has been advised of split billing requirements and indicates understanding: Yes    COUNSELING:   Reviewed preventive health counseling, as reflected in patient instructions  Special attention given to:        Regular exercise       Healthy diet/nutrition    Estimated body mass index is 33.17 kg/m  as calculated from the following:    Height as of 1/26/21: 1.905 m (6' 3\").    Weight as of this encounter: 120.4 kg (265 lb 6.4 oz).     Weight management plan: Discussed healthy diet and exercise guidelines    He reports that he has never smoked. He has never used smokeless tobacco.      Counseling Resources:  ATP IV Guidelines  Pooled Cohorts Equation Calculator  FRAX Risk Assessment  ICSI Preventive Guidelines  Dietary Guidelines for Americans, 2010  USDA's MyPlate  ASA Prophylaxis  Lung CA Screening    Yunier Moore MD  [facility]  "

## 2022-04-27 LAB
HCV AB SERPL QL IA: NEGATIVE
VZV IGG SER QL IA: >4000 INDEX
VZV IGG SER QL IA: POSITIVE

## 2022-06-22 ENCOUNTER — ANESTHESIA EVENT (OUTPATIENT)
Dept: SURGERY | Facility: AMBULATORY SURGERY CENTER | Age: 62
End: 2022-06-22
Payer: COMMERCIAL

## 2022-06-23 ENCOUNTER — HOSPITAL ENCOUNTER (OUTPATIENT)
Facility: AMBULATORY SURGERY CENTER | Age: 62
Discharge: HOME | End: 2022-06-23
Attending: FAMILY MEDICINE
Payer: COMMERCIAL

## 2022-06-23 ENCOUNTER — ANESTHESIA (OUTPATIENT)
Dept: SURGERY | Facility: AMBULATORY SURGERY CENTER | Age: 62
End: 2022-06-23
Payer: COMMERCIAL

## 2022-06-23 VITALS
TEMPERATURE: 96.8 F | DIASTOLIC BLOOD PRESSURE: 82 MMHG | BODY MASS INDEX: 31.66 KG/M2 | RESPIRATION RATE: 16 BRPM | OXYGEN SATURATION: 97 % | HEIGHT: 76 IN | SYSTOLIC BLOOD PRESSURE: 138 MMHG | HEART RATE: 62 BPM | WEIGHT: 260 LBS

## 2022-06-23 DIAGNOSIS — Z86.0100 HISTORY OF COLON POLYPS: ICD-10-CM

## 2022-06-23 PROCEDURE — 45380 COLONOSCOPY AND BIOPSY: CPT | Mod: 33 | Performed by: FAMILY MEDICINE

## 2022-06-23 RX ORDER — SODIUM CHLORIDE, SODIUM LACTATE, POTASSIUM CHLORIDE, CALCIUM CHLORIDE 600; 310; 30; 20 MG/100ML; MG/100ML; MG/100ML; MG/100ML
INJECTION, SOLUTION INTRAVENOUS CONTINUOUS
Status: DISCONTINUED | OUTPATIENT
Start: 2022-06-23 | End: 2022-06-24 | Stop reason: HOSPADM

## 2022-06-23 RX ORDER — HYDROMORPHONE HCL IN WATER/PF 6 MG/30 ML
0.2 PATIENT CONTROLLED ANALGESIA SYRINGE INTRAVENOUS EVERY 5 MIN PRN
Status: DISCONTINUED | OUTPATIENT
Start: 2022-06-23 | End: 2022-06-24 | Stop reason: HOSPADM

## 2022-06-23 RX ORDER — LIDOCAINE 40 MG/G
CREAM TOPICAL
Status: DISCONTINUED | OUTPATIENT
Start: 2022-06-23 | End: 2022-06-24 | Stop reason: HOSPADM

## 2022-06-23 RX ORDER — FENTANYL CITRATE 0.05 MG/ML
25 INJECTION, SOLUTION INTRAMUSCULAR; INTRAVENOUS
Status: DISCONTINUED | OUTPATIENT
Start: 2022-06-23 | End: 2022-06-24 | Stop reason: HOSPADM

## 2022-06-23 RX ORDER — MEPERIDINE HYDROCHLORIDE 25 MG/ML
12.5 INJECTION INTRAMUSCULAR; INTRAVENOUS; SUBCUTANEOUS
Status: DISCONTINUED | OUTPATIENT
Start: 2022-06-23 | End: 2022-06-24 | Stop reason: HOSPADM

## 2022-06-23 RX ORDER — ONDANSETRON 2 MG/ML
INJECTION INTRAMUSCULAR; INTRAVENOUS PRN
Status: DISCONTINUED | OUTPATIENT
Start: 2022-06-23 | End: 2022-06-23

## 2022-06-23 RX ORDER — FENTANYL CITRATE 0.05 MG/ML
25 INJECTION, SOLUTION INTRAMUSCULAR; INTRAVENOUS EVERY 5 MIN PRN
Status: DISCONTINUED | OUTPATIENT
Start: 2022-06-23 | End: 2022-06-24 | Stop reason: HOSPADM

## 2022-06-23 RX ORDER — OXYCODONE HYDROCHLORIDE 5 MG/1
5 TABLET ORAL EVERY 4 HOURS PRN
Status: DISCONTINUED | OUTPATIENT
Start: 2022-06-23 | End: 2022-06-24 | Stop reason: HOSPADM

## 2022-06-23 RX ORDER — ONDANSETRON 2 MG/ML
4 INJECTION INTRAMUSCULAR; INTRAVENOUS EVERY 30 MIN PRN
Status: DISCONTINUED | OUTPATIENT
Start: 2022-06-23 | End: 2022-06-24 | Stop reason: HOSPADM

## 2022-06-23 RX ORDER — PROPOFOL 10 MG/ML
INJECTION, EMULSION INTRAVENOUS PRN
Status: DISCONTINUED | OUTPATIENT
Start: 2022-06-23 | End: 2022-06-23

## 2022-06-23 RX ORDER — LIDOCAINE HYDROCHLORIDE 20 MG/ML
INJECTION, SOLUTION INFILTRATION; PERINEURAL PRN
Status: DISCONTINUED | OUTPATIENT
Start: 2022-06-23 | End: 2022-06-23

## 2022-06-23 RX ORDER — PROPOFOL 10 MG/ML
INJECTION, EMULSION INTRAVENOUS CONTINUOUS PRN
Status: DISCONTINUED | OUTPATIENT
Start: 2022-06-23 | End: 2022-06-23

## 2022-06-23 RX ORDER — ONDANSETRON 4 MG/1
4 TABLET, ORALLY DISINTEGRATING ORAL EVERY 30 MIN PRN
Status: DISCONTINUED | OUTPATIENT
Start: 2022-06-23 | End: 2022-06-24 | Stop reason: HOSPADM

## 2022-06-23 RX ADMIN — ONDANSETRON 4 MG: 2 INJECTION INTRAMUSCULAR; INTRAVENOUS at 13:26

## 2022-06-23 RX ADMIN — PROPOFOL 200 MCG/KG/MIN: 10 INJECTION, EMULSION INTRAVENOUS at 13:24

## 2022-06-23 RX ADMIN — SODIUM CHLORIDE, SODIUM LACTATE, POTASSIUM CHLORIDE, CALCIUM CHLORIDE: 600; 310; 30; 20 INJECTION, SOLUTION INTRAVENOUS at 12:57

## 2022-06-23 RX ADMIN — LIDOCAINE HYDROCHLORIDE 40 MG: 20 INJECTION, SOLUTION INFILTRATION; PERINEURAL at 13:24

## 2022-06-23 RX ADMIN — PROPOFOL 50 MG: 10 INJECTION, EMULSION INTRAVENOUS at 13:24

## 2022-06-23 ASSESSMENT — LIFESTYLE VARIABLES: TOBACCO_USE: 1

## 2022-06-23 NOTE — PROGRESS NOTES
I, the writer, have viewed a picture that the patient brought with showing a negative COVID-19 result.    Eleuterio Corbett RN on 6/23/2022 at 12:41 PM

## 2022-06-23 NOTE — LETTER
22      Barron Delgado  [address]  [place] [address]    : 1960    Barron Delgado,    The results from your colonoscopy showed a hyperplastic polyp (considered normal tissue). Based on these results, there isn't a clear recommendation from current guidelines on follow up (as we talked about on the phone). Likely, we should repeat your exam in no earlier than 5 years and no later than 10 years (the years 2446-5097).    Thank you coming to see me for your colonoscopy. Please let me know if there is any further information that you need.    Dr. Dionne Truong III, MD, FAAFP  Faculty Physician, [facility]    Department of Family Medicine and [facility]  [facility]    Office: [phone]

## 2022-06-23 NOTE — DISCHARGE INSTRUCTIONS
Colonoscopy Discharge Instructions  When You Leave Today:  The medications you received today may affect your short-term memory, balance/coordination, and reflexes. It may cause drowsiness, slow reflexes, and impaired thoughts. We recommend that you go home and rest for the remainder of the day. Do NOT DRIVE, go back to work or do anything that requires a clear thought process. Examples include but are not limited to: Do NOT drive, operate any machines, make important personal or work decisions, or sign legal documentation for 24 hours.      You may feel bloated because of the air that was introduced during the exam. Flatulence and belching is expected. If you feel like the air isn't coming out easily you can try laying on your right side to help the air move in the correct direction. Holding in the air can cause abdominal pain, cramping, and in some cases nausea. Let the gas pass!!    You may eat and drink what you can tolerate after your exam.  We recommend that you start with something light and progress as tolerated. You will be gassy, so avoiding things that create more gas is beneficial. This includes things like beans, carbonated beverages, and whole vegetables like broccoli/brussel sprouts/cabbage.     AVOID alcohol for 24 hours. This could have an adverse reaction mixed with the sedative.       You may resume your normal home medications unless told otherwise. Do not double up on any missed doses.        Avoid Aspirin and Aspirin containing products for 3 days if biopsies were taken. This will allow the areas to heal and will decrease your risk of bleeding.      Watch the area where your IV was inserted. If there is any swelling, severe pain or the area feels hot, place a warm, wet cloth over the area for 10-20 minutes. A small bruise is normal. If you continue to have discomfort, contact your doctor.      If you use a CPAP at home, please use it for the next 24-48 hours.    If any of the following  occur, please go to your CLOSEST EMERGENCY ROOM.    Increasing abdominal pain (if you are unable to pass gas or doubled over in pain)  Fever of 101.5 degrees or higher  Bleeding (a small amount of bleeding is normal when wiping if you have hemorrhoids or we remove tissue samples. However if you are filling the toilet with bright red blood, this is an emergency)    Call the [facility] [phone] if you have any questions or concerns regarding your procedure.  The [facility] is open from 8am to 5pm Monday through Friday.  DO not come to the clinic for severe post procedure complications, go to your closest ER.     Colonoscopy:    X Biopsies Taken    Normal- Follow up__________________________ (unless condition changes or treatment guidelines change)   X Diverticulosis   X Hemorrhoids X Internal  External   X High Fiber Diet           Other _______________________________________________________________    If biopsies were taken:  A letter will be mailed to you with your biopsy results and further recommendations in approximately   1-2 weeks.  If you have not received a letter within 3 weeks, please call the [facility] [phone].

## 2022-06-23 NOTE — ANESTHESIA CARE TRANSFER NOTE
Patient: Barron Delgado    Procedure: Procedure(s):  COLONOSCOPY       Diagnosis: History of colon polyps [Z86.010]  Diagnosis Additional Information: No value filed.    Anesthesia Type:   MAC     Note:    Oropharynx: oropharynx clear of all foreign objects and spontaneously breathing  Level of Consciousness: awake  Oxygen Supplementation: face mask  Level of Supplemental Oxygen (L/min / FiO2): 6  Independent Airway: airway patency satisfactory and stable  Dentition: dentition unchanged  Vital Signs Stable: post-procedure vital signs reviewed and stable  Report to RN Given: handoff report given  Patient transferred to: Phase II    Handoff Report: Identifed the Patient, Identified the Reponsible Provider, Reviewed the pertinent medical history, Discussed the surgical course, Reviewed Intra-OP anesthesia mangement and issues during anesthesia, Set expectations for post-procedure period and Allowed opportunity for questions and acknowledgement of understanding      Vitals:  Vitals Value Taken Time   /77 06/23/22 1359   Temp 98  F (36.7  C) 06/23/22 1359   Pulse 88 06/23/22 1359   Resp 16 06/23/22 1359   SpO2 98 % 06/23/22 1359       Electronically Signed By: DEBRA Wilcox CRNA  June 23, 2022  2:00 PM

## 2022-06-23 NOTE — ANESTHESIA POSTPROCEDURE EVALUATION
Patient: Barron Delgado    Procedure: Procedure(s):  COLONOSCOPY       Anesthesia Type:  MAC    Note:  Disposition: Outpatient   Postop Pain Control: Uneventful            Sign Out: Well controlled pain   PONV: No   Neuro/Psych: Uneventful            Sign Out: Acceptable/Baseline neuro status   Airway/Respiratory: Uneventful            Sign Out: Acceptable/Baseline resp. status   CV/Hemodynamics: Uneventful            Sign Out: Acceptable CV status; No obvious hypovolemia; No obvious fluid overload   Other NRE: NONE   DID A NON-ROUTINE EVENT OCCUR? No           Last vitals:  Vitals Value Taken Time   /77 06/23/22 1359   Temp 98  F (36.7  C) 06/23/22 1359   Pulse 88 06/23/22 1359   Resp 16 06/23/22 1359   SpO2 98 % 06/23/22 1359       Electronically Signed By: Rodo Orta MD  June 23, 2022  2:20 PM

## 2022-06-30 NOTE — PROGRESS NOTES
22    RE: BARRON DELGADO, : 1960    Yunier Moore    I have performed the colonoscopy on Barron Delgado and found a single hyperplastic polyp. From what I can gather from the patient and the chart, he had a colonoscopy with HRA (high risk adenoma) in . This is his second normal colonoscopy since then. The most recent guidelines do not have a recommendation to time to next colonoscopy. Likely, it doesn't need to happen any earlier than 5 years and no longer than 10 years (the years 7532-6219) in the absence of new symptoms.    Your patient has been informed of the results and recommended follow up plan.    Thank you for the referral. Please let me know if there is any further information that you need.    Very Respectfully,    Clay Truong III, MD, FAAFP  Faculty Physician, [facility]    Department of Family Medicine and Community Health  [facility] [employer]    Office: [phone]

## 2022-07-13 NOTE — OP NOTE
COLONOSCOPY OPERATION REPORT     OPERATION PERFORMED:  Colonoscopy with MAC  DATE OF OPERATION: 23 June 2022  SURGEON(S): David Truong III, MD, FAAFP    ASSISTANT(S): Cecile Hernandez MD    Preoperative Diagnosis: Encounter for colonoscopy following colon polyp removal [Z09, Z86.010]    Postoperative Diagnosis: Polyps of the Colon, diverticulosis, internal hemorrhoids    History:    61yom here for his 3rd colonoscopy. First was performed in 2009 as a follow up to diverticulitis. Polyps were found. Repeat in 2015 with no polyps. Denies symptoms. No family history of colon cancer.        Shortly Before Procedure  Time out was completed. Correct patient ID, procedure verified, positioning verified, implants/equipment available, studies available as needed. Consents signed and on the chart. Emergency resuscitation equipment available if needed. TYPE OF ANESTHESIA:  MAC. Patient monitored in the usual fashion with pulse ox, NIBP, and EKG. See flowsheet for full details.      DESCRIPTION OF OPERATION:  Assuring patient comfort, a rectal exam revealed normal sphincter tone, no masses, no stool in the rectal vault. The video colonoscope was passed from anus to cecum under direct visualization with expected amount of difficulty. The cecum was identified by the ileocecal valve, appendiceal orifice, and crows foot. Mucosa was inspected > 6 minute scope withdrawal.         Findings   Prep:    Good   EBL:                     < 5 mL   Terminal Ileum: Normal appearance   Cecum:                     Normal appearance.   Ascending Colon:    Normal appearance.       Transverse Colon:   Mild diverticulosis        Desc. Colon: Mild diverticulosis       Sigmoid Colon:        Single polyp <5mm removed by cold bx forceps.  Moderate diverticulosis.   Rectum:                    Moderate internal hemorrhoids       Complications: None.     Polyps submitted:                     1   Diverticulosis:    Moderate   Internal Hemorrhoids:  Moderate   External Hemorrhoids: None       TIMES time  min   PROCEDURE START 1329 TOTAL PROCEDURE TIME 25   CECUM REACHED 1338 TIME TO CECUM 9   PROCEDURE STOP 1354 WITHDRAWAL TIME 16       DISPOSITION   Follow up/Repeat Colonoscopy:                     Pending pathology review.             David Truong III, MD, FAAFP     Star Wedge Flap Text: The defect edges were debeveled with a #15 scalpel blade.  Given the location of the defect, shape of the defect and the proximity to free margins a star wedge flap was deemed most appropriate.  Using a sterile surgical marker, an appropriate rotation flap was drawn incorporating the defect and placing the expected incisions within the relaxed skin tension lines where possible. The area thus outlined was incised deep to adipose tissue with a #15 scalpel blade.  The skin margins were undermined to an appropriate distance in all directions utilizing iris scissors.

## 2022-08-19 ENCOUNTER — OFFICE VISIT (OUTPATIENT)
Dept: FAMILY MEDICINE | Facility: CLINIC | Age: 62
End: 2022-08-19
Payer: COMMERCIAL

## 2022-08-19 VITALS
HEART RATE: 66 BPM | BODY MASS INDEX: 32.46 KG/M2 | WEIGHT: 266.7 LBS | RESPIRATION RATE: 18 BRPM | OXYGEN SATURATION: 97 % | DIASTOLIC BLOOD PRESSURE: 81 MMHG | TEMPERATURE: 98.6 F | SYSTOLIC BLOOD PRESSURE: 144 MMHG

## 2022-08-19 DIAGNOSIS — Z13.9 SCREENING FOR CONDITION: Primary | ICD-10-CM

## 2022-08-19 DIAGNOSIS — R21 RASH: ICD-10-CM

## 2022-08-19 PROCEDURE — 99214 OFFICE O/P EST MOD 30 MIN: CPT | Performed by: PHYSICIAN ASSISTANT

## 2022-08-19 PROCEDURE — 87593 ORTHOPOXVIRUS AMP PRB EACH: CPT | Performed by: PHYSICIAN ASSISTANT

## 2022-08-19 NOTE — PROGRESS NOTES
Patient presents with:  Derm Problem: Patient just got back from [place] yesterday.  Patient wants to make sure its not monkey pox.      Clinical Decision Making:  Advised the patient that he does have a rash that could be consistent with monkey box.  He is screened for monkey box and will have quarantine until the test results are known to him.  Follow-up with his primary care provider for reevaluation and treatment if new symptoms or concerns arise.      ICD-10-CM    1. Screening for condition  Z13.9 Orthopoxvirus (includes monkeypox) by PCR   2. Rash  R21 Orthopoxvirus (includes monkeypox) by PCR       Patient Instructions   You were screened for lung keep ox  Monitor the patient portal and MyChart for results    Follow-up with your primary care provider if not getting good resolution of new symptoms or concerns arise          HPI:  Barron Delgado is a 62 year old male who has been traveling at a conference and was in [place] for the week and returned yesterday.  Patient noticed that he has a rash that is starting to spread over the bilateral upper extremities and anterior posterior chest and abdominal walls.  Patient is requesting screening for lung TPOXX.  At this time he does not have a prodrome with viral illness to include headache muscle aches joint aches fever myalgias or arthralgias.  He has not tried treatment for this at home.  No other family members have symptoms at this time    History obtained from chart review and the patient.    Problem List:  There are no relevant problems documented for this patient.      Past Medical History:   Diagnosis Date     Diverticulitis      Sleep apnea        Social History     Tobacco Use     Smoking status: Never Smoker     Smokeless tobacco: Former User     Types: Chew     Quit date: 2020   Substance Use Topics     Alcohol use: Yes     Alcohol/week: 10.0 standard drinks     Comment: Alcoholic Drinks/day: Occasionally       Review of Systems  As above in HPI  otherwise negative.    Vitals:    08/19/22 1038   BP: (!) 144/81   Pulse: 66   Resp: 18   Temp: 98.6  F (37  C)   TempSrc: Oral   SpO2: 97%   Weight: 121 kg (266 lb 11.2 oz)       General: Patient is resting comfortably no acute distress is afebrile  HEENT: Head is normocephalic atraumatic   eyes are PERRL EOMI sclera anicteric   TMs are clear bilaterally  Throat is clear no rash on the mucous membranes.  No cervical lymphadenopathy present  LUNGS: Clear to auscultation bilaterally  HEART: Regular rate and rhythm  Skin: With uniform sized rash on the elbows forearms hands and abdomen and thoracic chest wall.  Patient also has a circumscribed circular ecchymotic lesion on the thenar eminence of the hand with a central depression.    Physical Exam      Labs:    Monkeypox testing is pending.    At the end of the encounter, I discussed results, diagnosis, medications. Discussed red flags for immediate return to clinic/ER, as well as indications for follow up if no improvement. Patient understood and agreed to plan. Patient was stable for discharge.

## 2022-08-19 NOTE — PATIENT INSTRUCTIONS
You were screened for lung keep ox  Monitor the patient portal and [employer] for results    Follow-up with your primary care provider if not getting good resolution of new symptoms or concerns arise

## 2022-08-22 ENCOUNTER — NURSE TRIAGE (OUTPATIENT)
Dept: NURSING | Facility: CLINIC | Age: 62
End: 2022-08-22

## 2022-08-22 NOTE — TELEPHONE ENCOUNTER
Patient calling for results of his Monkey Pox test.    Test still processing.    No results yet.    Pamela Duncan RN   [facility] Nurse Advisor    Reason for Disposition    Information only question and nurse able to answer    Protocols used: INFORMATION ONLY CALL - NO TRIAGE-A-[place]

## 2022-08-23 LAB — ORTHOPOXVIRUS DNA SPEC QL NAA+PROBE: NOT DETECTED

## 2022-09-26 DIAGNOSIS — E78.5 HYPERLIPIDEMIA, UNSPECIFIED HYPERLIPIDEMIA TYPE: ICD-10-CM

## 2022-09-26 RX ORDER — ATORVASTATIN CALCIUM 40 MG/1
40 TABLET, FILM COATED ORAL AT BEDTIME
Qty: 90 TABLET | Refills: 3 | Status: CANCELLED | OUTPATIENT
Start: 2022-09-26

## 2022-09-26 NOTE — TELEPHONE ENCOUNTER
Last Written Prescription Date:  1/7/22  Last Fill Quantity: 90,  # refills: 3     Should have refills on file.    Lisa Simmons RN on 9/26/2022 at 4:28 PM

## 2022-10-01 ENCOUNTER — HEALTH MAINTENANCE LETTER (OUTPATIENT)
Age: 62
End: 2022-10-01

## 2023-01-10 DIAGNOSIS — E78.5 HYPERLIPIDEMIA, UNSPECIFIED HYPERLIPIDEMIA TYPE: ICD-10-CM

## 2023-01-12 RX ORDER — ATORVASTATIN CALCIUM 40 MG/1
40 TABLET, FILM COATED ORAL AT BEDTIME
Qty: 90 TABLET | Refills: 3 | Status: SHIPPED | OUTPATIENT
Start: 2023-01-12

## 2023-01-12 NOTE — TELEPHONE ENCOUNTER
"Routing refill request to provider for review/approval because:  NO PCP listed    Last Written Prescription Date:  1/7/2022  Last Fill Quantity: 90,  # refills: 3   Last office visit provider:  8/19/2022     Requested Prescriptions   Pending Prescriptions Disp Refills     atorvastatin (LIPITOR) 40 MG tablet 90 tablet 3     Sig: Take 1 tablet (40 mg) by mouth At Bedtime       Statins Protocol Passed - 1/10/2023  4:47 PM        Passed - LDL on file in past 12 months     Recent Labs   Lab Test 04/26/22  1200   *             Passed - No abnormal creatine kinase in past 12 months     No lab results found.             Passed - Recent (12 mo) or future (30 days) visit within the authorizing provider's specialty     Patient has had an office visit with the authorizing provider or a provider within the authorizing providers department within the previous 12 mos or has a future within next 30 days. See \"Patient Info\" tab in inbasket, or \"Choose Columns\" in Meds & Orders section of the refill encounter.              Passed - Medication is active on med list        Passed - Patient is age 18 or older             Agnes Zelaya RN 01/11/23 11:14 PM  "

## 2023-06-04 ENCOUNTER — HEALTH MAINTENANCE LETTER (OUTPATIENT)
Age: 63
End: 2023-06-04

## 2024-01-16 DIAGNOSIS — N52.9 ERECTILE DYSFUNCTION, UNSPECIFIED ERECTILE DYSFUNCTION TYPE: ICD-10-CM

## 2024-01-16 RX ORDER — SILDENAFIL 50 MG/1
50 TABLET, FILM COATED ORAL DAILY PRN
Qty: 30 TABLET | Refills: 1 | Status: CANCELLED | OUTPATIENT
Start: 2024-01-16

## 2024-01-16 NOTE — TELEPHONE ENCOUNTER
01/16/24  Spoke with pt and attempted to schedule. Pt states he has switched to [facility]. Relayed to pt to have his pharmacy send all future refill requests there.  Kasey

## 2024-01-16 NOTE — TELEPHONE ENCOUNTER
Refill Request  Medication name: Pending Prescriptions:                       Disp   Refills    sildenafil (VIAGRA) 50 MG tablet          30 tab*1            Sig: Take 1 tablet (50 mg) by mouth daily as needed           (ED)    Requested Pharmacy:     [id number] [place], [place] - [address] [facility] [id number] [place], [place] - [address] [facility]

## 2024-07-21 ENCOUNTER — HEALTH MAINTENANCE LETTER (OUTPATIENT)
Age: 64
End: 2024-07-21

## 2025-08-10 ENCOUNTER — HEALTH MAINTENANCE LETTER (OUTPATIENT)
Age: 65
End: 2025-08-10